# Patient Record
Sex: MALE | Race: WHITE | NOT HISPANIC OR LATINO | URBAN - METROPOLITAN AREA
[De-identification: names, ages, dates, MRNs, and addresses within clinical notes are randomized per-mention and may not be internally consistent; named-entity substitution may affect disease eponyms.]

---

## 2024-02-14 ENCOUNTER — LAB REQUISITION (OUTPATIENT)
Dept: LAB | Facility: LAB | Age: 34
End: 2024-02-14

## 2024-02-14 DIAGNOSIS — Z00.00 ENCOUNTER FOR GENERAL ADULT MEDICAL EXAMINATION WITHOUT ABNORMAL FINDINGS: ICD-10-CM

## 2024-02-14 LAB — TESTOST SERPL-MCNC: 445 NG/DL (ref 240–1000)

## 2024-02-14 PROCEDURE — 84403 ASSAY OF TOTAL TESTOSTERONE: CPT

## 2024-11-12 ENCOUNTER — LAB (OUTPATIENT)
Dept: LAB | Facility: LAB | Age: 34
End: 2024-11-12
Payer: COMMERCIAL

## 2024-11-12 ENCOUNTER — APPOINTMENT (OUTPATIENT)
Dept: PRIMARY CARE | Facility: CLINIC | Age: 34
End: 2024-11-12
Payer: COMMERCIAL

## 2024-11-12 VITALS
BODY MASS INDEX: 26.88 KG/M2 | SYSTOLIC BLOOD PRESSURE: 132 MMHG | HEIGHT: 71 IN | WEIGHT: 192 LBS | DIASTOLIC BLOOD PRESSURE: 81 MMHG | HEART RATE: 69 BPM

## 2024-11-12 DIAGNOSIS — Z13.29 SCREENING FOR THYROID DISORDER: ICD-10-CM

## 2024-11-12 DIAGNOSIS — E55.9 VITAMIN D DEFICIENCY: ICD-10-CM

## 2024-11-12 DIAGNOSIS — R21 RASH: ICD-10-CM

## 2024-11-12 DIAGNOSIS — E78.2 MIXED HYPERLIPIDEMIA: ICD-10-CM

## 2024-11-12 DIAGNOSIS — E78.2 MIXED HYPERLIPIDEMIA: Primary | ICD-10-CM

## 2024-11-12 LAB
ALBUMIN SERPL BCP-MCNC: 5.2 G/DL (ref 3.4–5)
ALP SERPL-CCNC: 54 U/L (ref 33–120)
ALT SERPL W P-5'-P-CCNC: 15 U/L (ref 10–52)
ANION GAP SERPL CALC-SCNC: 12 MMOL/L (ref 10–20)
AST SERPL W P-5'-P-CCNC: 18 U/L (ref 9–39)
BASOPHILS # BLD AUTO: 0.06 X10*3/UL (ref 0–0.1)
BASOPHILS NFR BLD AUTO: 0.5 %
BILIRUB SERPL-MCNC: 1.1 MG/DL (ref 0–1.2)
BUN SERPL-MCNC: 24 MG/DL (ref 6–23)
CALCIUM SERPL-MCNC: 10.1 MG/DL (ref 8.6–10.3)
CHLORIDE SERPL-SCNC: 100 MMOL/L (ref 98–107)
CO2 SERPL-SCNC: 30 MMOL/L (ref 21–32)
CREAT SERPL-MCNC: 0.94 MG/DL (ref 0.5–1.3)
EGFRCR SERPLBLD CKD-EPI 2021: >90 ML/MIN/1.73M*2
EOSINOPHIL # BLD AUTO: 0.21 X10*3/UL (ref 0–0.7)
EOSINOPHIL NFR BLD AUTO: 1.9 %
ERYTHROCYTE [DISTWIDTH] IN BLOOD BY AUTOMATED COUNT: 12.5 % (ref 11.5–14.5)
GLUCOSE SERPL-MCNC: 87 MG/DL (ref 74–99)
HCT VFR BLD AUTO: 45.5 % (ref 41–52)
HGB BLD-MCNC: 15.8 G/DL (ref 13.5–17.5)
IMM GRANULOCYTES # BLD AUTO: 0.06 X10*3/UL (ref 0–0.7)
IMM GRANULOCYTES NFR BLD AUTO: 0.5 % (ref 0–0.9)
LYMPHOCYTES # BLD AUTO: 3.34 X10*3/UL (ref 1.2–4.8)
LYMPHOCYTES NFR BLD AUTO: 29.4 %
MCH RBC QN AUTO: 30.7 PG (ref 26–34)
MCHC RBC AUTO-ENTMCNC: 34.7 G/DL (ref 32–36)
MCV RBC AUTO: 89 FL (ref 80–100)
MONOCYTES # BLD AUTO: 0.78 X10*3/UL (ref 0.1–1)
MONOCYTES NFR BLD AUTO: 6.9 %
NEUTROPHILS # BLD AUTO: 6.9 X10*3/UL (ref 1.2–7.7)
NEUTROPHILS NFR BLD AUTO: 60.8 %
NRBC BLD-RTO: 0 /100 WBCS (ref 0–0)
PLATELET # BLD AUTO: 250 X10*3/UL (ref 150–450)
POTASSIUM SERPL-SCNC: 3.8 MMOL/L (ref 3.5–5.3)
PROT SERPL-MCNC: 7.9 G/DL (ref 6.4–8.2)
RBC # BLD AUTO: 5.14 X10*6/UL (ref 4.5–5.9)
SODIUM SERPL-SCNC: 138 MMOL/L (ref 136–145)
TSH SERPL-ACNC: 2.65 MIU/L (ref 0.44–3.98)
WBC # BLD AUTO: 11.4 X10*3/UL (ref 4.4–11.3)

## 2024-11-12 PROCEDURE — 85025 COMPLETE CBC W/AUTO DIFF WBC: CPT

## 2024-11-12 PROCEDURE — 3008F BODY MASS INDEX DOCD: CPT | Performed by: INTERNAL MEDICINE

## 2024-11-12 PROCEDURE — 84443 ASSAY THYROID STIM HORMONE: CPT

## 2024-11-12 PROCEDURE — 80053 COMPREHEN METABOLIC PANEL: CPT

## 2024-11-12 PROCEDURE — 82306 VITAMIN D 25 HYDROXY: CPT

## 2024-11-12 PROCEDURE — 1036F TOBACCO NON-USER: CPT | Performed by: INTERNAL MEDICINE

## 2024-11-12 PROCEDURE — 36415 COLL VENOUS BLD VENIPUNCTURE: CPT

## 2024-11-12 PROCEDURE — 99214 OFFICE O/P EST MOD 30 MIN: CPT | Performed by: INTERNAL MEDICINE

## 2024-11-12 ASSESSMENT — LIFESTYLE VARIABLES
HAVE YOU OR SOMEONE ELSE BEEN INJURED AS A RESULT OF YOUR DRINKING: NO
SKIP TO QUESTIONS 9-10: 1
HOW OFTEN DO YOU HAVE SIX OR MORE DRINKS ON ONE OCCASION: NEVER
HAS A RELATIVE, FRIEND, DOCTOR, OR ANOTHER HEALTH PROFESSIONAL EXPRESSED CONCERN ABOUT YOUR DRINKING OR SUGGESTED YOU CUT DOWN: NO
AUDIT TOTAL SCORE: 0
HOW MANY STANDARD DRINKS CONTAINING ALCOHOL DO YOU HAVE ON A TYPICAL DAY: PATIENT DOES NOT DRINK
AUDIT-C TOTAL SCORE: 0
HOW OFTEN DO YOU HAVE A DRINK CONTAINING ALCOHOL: NEVER

## 2024-11-12 ASSESSMENT — PATIENT HEALTH QUESTIONNAIRE - PHQ9
SUM OF ALL RESPONSES TO PHQ9 QUESTIONS 1 AND 2: 0
1. LITTLE INTEREST OR PLEASURE IN DOING THINGS: NOT AT ALL
2. FEELING DOWN, DEPRESSED OR HOPELESS: NOT AT ALL

## 2024-11-12 NOTE — PROGRESS NOTES
Assessment/Plan   34 years old male with nonspecific symptoms of rash occurs when he takes the over-the-counter supplement to build his muscle and also now happens after he drinks alcohol.  On my exam there is no evidence of any liver damage but we will do the blood test to make sure there is no liver abnormalities.  Will also do vitamin D levels.  His rest of the blood counts and electrolyte panels also will be checked.    Healthy lifestyle was discussed and I advised him to stop all the supplements including wheat protein.    If his lab test acceptable except elevated cholesterol he will change his lifestyle and he will have a follow-up in 3 months with his PCP to have a reevaluation.          Problem List Items Addressed This Visit       Mixed hyperlipidemia - Primary    Relevant Orders    CBC and Auto Differential (Completed)    Comprehensive Metabolic Panel (Completed)    TSH with reflex to Free T4 if abnormal (Completed)    Vitamin D 25-Hydroxy,Total (for eval of Vitamin D levels)    Screening for thyroid disorder    Relevant Orders    TSH with reflex to Free T4 if abnormal (Completed)    Vitamin D deficiency    Relevant Orders    Vitamin D 25-Hydroxy,Total (for eval of Vitamin D levels)    Rash    Relevant Orders    CBC and Auto Differential (Completed)    Comprehensive Metabolic Panel (Completed)    TSH with reflex to Free T4 if abnormal (Completed)    Vitamin D 25-Hydroxy,Total (for eval of Vitamin D levels)       Subjective     Patient ID: Gerardo Driver is a 34 y.o. male who presents for Results.    History of present illness  34 years old male who is a  came for a visit because he says he could not get into his primary care physician.    He is health-conscious and also he wants to build more muscles.  He has been taking wheat protein but also he was taking on and off Testolone drops.  He says he did it for about maybe a year also few years ago then he developed side effects including rash and  some nonspecific symptoms.  He also says that his cholesterol increases when he takes it.  He stopped it but then he has been taking it again until about 2 to 3 weeks ago.  Because he got the side effects he stopped it.  But he says that even after he stopped it when he drinks alcohol he gets a rash.  He drinks once a week about 6-10 beer at a time.    He has now had the blood test done through his workplace and it shows elevated cholesterol so he wants to have further blood test to make sure he is liver is okay.  He has been reading in the Internet and he says that he was informed by the Internet that his symptoms could be liver damage and/or other abnormalities-from the medication.    He denies any chest pain no palpitation no urine bowel changes.    He is active and exercising regularly.  He is  and denies any risk of STDs.  He does not use any other drugs.    He does vape.  Patient is a father has history of hyperlipidemia.  His grandfather had hyperlipidemia and had premature coronary artery disease.    Rest of the review of systems no acute complaints.    Family History   Problem Relation Name Age of Onset    Hypertension Father      Aortic aneurysm Father        Social History     Socioeconomic History    Marital status:      Spouse name: Not on file    Number of children: Not on file    Years of education: Not on file    Highest education level: Not on file   Occupational History    Not on file   Tobacco Use    Smoking status: Former     Types: Cigarettes    Smokeless tobacco: Former     Types: Chew   Vaping Use    Vaping status: Some Days    Substances: Nicotine    Devices: Refillable tank   Substance and Sexual Activity    Alcohol use: Not Currently    Drug use: Never    Sexual activity: Not on file   Other Topics Concern    Not on file   Social History Narrative    Not on file     Social Drivers of Health     Financial Resource Strain: Not on file   Food Insecurity: Not on file    Transportation Needs: Not on file   Physical Activity: Not on file   Stress: Not on file   Social Connections: Not on file   Intimate Partner Violence: Not on file   Housing Stability: Not on file      Patient has no known allergies.   No current outpatient medications on file.     No current facility-administered medications for this visit.        Objective     Vitals:    11/12/24 1525   BP: 132/81   Pulse: 69        Physical Exam     Normal-built, well-nourished  with no apparent distress. Alert oriented  Skin:  Normal turgor.  No rash.  Has multiple tattoos  Head:  Normocephalic, atraumatic.  Eyes:  Pupils are equal, round,.  No pallor of conjunctivae.  Mouth has moist oral mucosa.  Neck:  Supple.  No JVD.  No carotid bruit.  No thyromegaly. No cervical lymphadenopathy.  No clubbing  Chest:  Vesicular breathing Bilaterally good air entry and bilaterally clear to auscultation.  No wheezing.  No crackles.  Heart:  Regular rate and rhythm.  S1, S2 positive.  No murmur.  Abdomen:  Soft and nontender.  Bowel sounds are positive.  No organomegaly.  Extremities:  Bilaterally no pedal pitting edema.  Bilaterally 2+ dorsalis pedis pulses.  No calf tenderness. Homans sign is negative.  Neuro Exam: No focal signs. Gait is normal.        Problem List Items Addressed This Visit       Mixed hyperlipidemia - Primary    Relevant Orders    CBC and Auto Differential (Completed)    Comprehensive Metabolic Panel (Completed)    TSH with reflex to Free T4 if abnormal (Completed)    Vitamin D 25-Hydroxy,Total (for eval of Vitamin D levels)    Screening for thyroid disorder    Relevant Orders    TSH with reflex to Free T4 if abnormal (Completed)    Vitamin D deficiency    Relevant Orders    Vitamin D 25-Hydroxy,Total (for eval of Vitamin D levels)    Rash    Relevant Orders    CBC and Auto Differential (Completed)    Comprehensive Metabolic Panel (Completed)    TSH with reflex to Free T4 if abnormal (Completed)    Vitamin D  25-Hydroxy,Total (for eval of Vitamin D levels)        Orders Placed This Encounter   Procedures    CBC and Auto Differential     Standing Status:   Future     Number of Occurrences:   1     Standing Expiration Date:   5/12/2025     Order Specific Question:   Release result to [facility]     Answer:   Immediate    Comprehensive Metabolic Panel     Standing Status:   Future     Number of Occurrences:   1     Standing Expiration Date:   5/12/2025     Order Specific Question:   Release result to [facility]     Answer:   Immediate    TSH with reflex to Free T4 if abnormal     Standing Status:   Future     Number of Occurrences:   1     Standing Expiration Date:   5/12/2025     Order Specific Question:   Release result to [facility]     Answer:   Immediate    Vitamin D 25-Hydroxy,Total (for eval of Vitamin D levels)     Standing Status:   Future     Number of Occurrences:   1     Standing Expiration Date:   5/12/2025     Order Specific Question:   Release result to [facility]     Answer:   Immediate        Lab Results   Component Value Date    WBC 11.4 (H) 11/12/2024    HGB 15.8 11/12/2024    HCT 45.5 11/12/2024     11/12/2024    CHOL 211 (H) 09/22/2022    TRIG 117 09/22/2022    HDL 63.0 09/22/2022    ALT 15 11/12/2024    AST 18 11/12/2024     11/12/2024    K 3.8 11/12/2024     11/12/2024    CREATININE 0.94 11/12/2024    BUN 24 (H) 11/12/2024    CO2 30 11/12/2024    TSH 2.65 11/12/2024    INR 1.0 02/23/2020    HGBA1C 4.6 09/22/2022     Lab Results   Component Value Date    CHOL 211 (H) 09/22/2022    CHHDL 3.3 09/22/2022     Testolone drops

## 2024-11-13 LAB — 25(OH)D3 SERPL-MCNC: 26 NG/ML (ref 30–100)

## 2024-11-13 NOTE — RESULT ENCOUNTER NOTE
Has low vitamin D and should take OTC vitamin D 2000 units per day. All the other labs acceptable.  Continue same treatment and follow-up as discussed.

## 2024-12-26 ENCOUNTER — APPOINTMENT (OUTPATIENT)
Dept: UROLOGY | Facility: CLINIC | Age: 34
End: 2024-12-26
Payer: COMMERCIAL

## 2024-12-26 VITALS
WEIGHT: 195 LBS | DIASTOLIC BLOOD PRESSURE: 89 MMHG | HEIGHT: 70 IN | SYSTOLIC BLOOD PRESSURE: 138 MMHG | HEART RATE: 77 BPM | BODY MASS INDEX: 27.92 KG/M2

## 2024-12-26 DIAGNOSIS — N50.0 ATROPHIC TESTICLE: Primary | ICD-10-CM

## 2024-12-26 DIAGNOSIS — N50.89 LUMP IN THE TESTICLE: ICD-10-CM

## 2024-12-26 LAB
POC BILIRUBIN, URINE: NEGATIVE
POC BLOOD, URINE: NEGATIVE
POC GLUCOSE, URINE: NEGATIVE MG/DL
POC KETONES, URINE: NEGATIVE MG/DL
POC LEUKOCYTES, URINE: NEGATIVE
POC NITRITE,URINE: NEGATIVE
POC PH, URINE: 7.5 PH
POC PROTEIN, URINE: NEGATIVE MG/DL
POC SPECIFIC GRAVITY, URINE: 1.01
POC UROBILINOGEN, URINE: 0.2 EU/DL

## 2024-12-26 PROCEDURE — 81003 URINALYSIS AUTO W/O SCOPE: CPT | Performed by: UROLOGY

## 2024-12-26 PROCEDURE — 3008F BODY MASS INDEX DOCD: CPT | Performed by: UROLOGY

## 2024-12-26 PROCEDURE — 99204 OFFICE O/P NEW MOD 45 MIN: CPT | Performed by: UROLOGY

## 2024-12-26 NOTE — PROGRESS NOTES
12/26/2024  34-year-old gentleman self exam finding left scrotal lesion.  No pain.  Also complaining constipation.    History of left undescended testicle    Patient has no nausea, no vomiting, no fever.    Exam: Circumcised normal penis, left atrophic testicle, 5 mm nodule at left head of epididymis; right testicle unremarkable    IRMA: Normal prostate    We discussed left atrophic testicles, small lesion at left epididymis  We discussed scrotal ultrasound  We discussed the normal prostate  All the questions were answered, the patient expressed understanding and agreed to the plan.    Impression  Left undescended testicle  Left epididymal lesion  Constipation    Plan  Scrotal ultrasound for left scrotal lesion  Appointment in 3 weeks    Chief Complaint   Patient presents with    lump testicle     Patient is here today for new patient visit at the request of Leland Lozano for a lump left testicle  that has been present for approx. 2 weeks.  He does have a history of undescended testicle when he was a child.        Physical Exam     TODAYS LAB RESULTS:  Glucose, Urine  NEGATIVE mg/dl NEGATIVE   POC Bilirubin, Urine  NEGATIVE NEGATIVE   POC Ketones, Urine  NEGATIVE mg/dl NEGATIVE   POC Specific Gravity, Urine  1.005 - 1.035 1.015   POC Blood, Urine  NEGATIVE NEGATIVE   POC PH, Urine  No Reference Range Established PH 7.5   POC Protein, Urine  NEGATIVE mg/dl NEGATIVE   POC Urobilinogen, Urine  0.2, 1.0 EU/DL 0.2   Poc Nitrite, Urine  NEGATIVE NEGATIVE   POC Leukocytes, Urine  NEGATIVE NEGATIVE       ASSESSMENT&PLAN:      IMPRESSIONS:

## 2025-01-06 ENCOUNTER — HOSPITAL ENCOUNTER (OUTPATIENT)
Dept: RADIOLOGY | Facility: CLINIC | Age: 35
Discharge: HOME | End: 2025-01-06
Payer: COMMERCIAL

## 2025-01-06 DIAGNOSIS — N50.89 LUMP IN THE TESTICLE: ICD-10-CM

## 2025-01-06 DIAGNOSIS — N50.0 ATROPHIC TESTICLE: ICD-10-CM

## 2025-01-06 PROCEDURE — 76870 US EXAM SCROTUM: CPT

## 2025-01-06 PROCEDURE — 76870 US EXAM SCROTUM: CPT | Performed by: RADIOLOGY

## 2025-01-09 ENCOUNTER — TELEPHONE (OUTPATIENT)
Dept: UROLOGY | Facility: CLINIC | Age: 35
End: 2025-01-09
Payer: COMMERCIAL

## 2025-01-09 ENCOUNTER — PATIENT MESSAGE (OUTPATIENT)
Dept: UROLOGY | Facility: CLINIC | Age: 35
End: 2025-01-09
Payer: COMMERCIAL

## 2025-01-09 DIAGNOSIS — N45.3 EPIDIDYMOORCHITIS: ICD-10-CM

## 2025-01-09 DIAGNOSIS — N50.89 LUMP IN THE TESTICLE: Primary | ICD-10-CM

## 2025-01-09 DIAGNOSIS — N50.0 ATROPHIC TESTICLE: ICD-10-CM

## 2025-01-09 RX ORDER — CIPROFLOXACIN 500 MG/1
500 TABLET ORAL 2 TIMES DAILY
Qty: 14 TABLET | Refills: 0 | Status: SHIPPED | OUTPATIENT
Start: 2025-01-09 | End: 2025-01-16

## 2025-01-09 NOTE — TELEPHONE ENCOUNTER
Gerardo Driver  [birth year]   pt has appt on Jan 16th with Dr Howard   but he wants to know if the follow up appt is necessary...... because he lives   1 hr [place] 30 mins away  .

## 2025-01-16 ENCOUNTER — APPOINTMENT (OUTPATIENT)
Dept: UROLOGY | Facility: CLINIC | Age: 35
End: 2025-01-16
Payer: COMMERCIAL

## 2025-02-10 ENCOUNTER — APPOINTMENT (OUTPATIENT)
Dept: PRIMARY CARE | Facility: CLINIC | Age: 35
End: 2025-02-10
Payer: COMMERCIAL

## 2025-04-27 ENCOUNTER — TELEMEDICINE (OUTPATIENT)
Dept: URGENT CARE | Age: 35
End: 2025-04-27
Payer: COMMERCIAL

## 2025-04-27 DIAGNOSIS — J02.9 PHARYNGITIS, UNSPECIFIED ETIOLOGY: Primary | ICD-10-CM

## 2025-04-27 PROCEDURE — 1036F TOBACCO NON-USER: CPT | Performed by: NURSE PRACTITIONER

## 2025-04-27 PROCEDURE — 99213 OFFICE O/P EST LOW 20 MIN: CPT | Performed by: NURSE PRACTITIONER

## 2025-04-27 RX ORDER — ONDANSETRON 4 MG/1
4 TABLET, ORALLY DISINTEGRATING ORAL EVERY 8 HOURS PRN
Qty: 20 TABLET | Refills: 0 | Status: SHIPPED | OUTPATIENT
Start: 2025-04-27

## 2025-04-27 RX ORDER — AMOXICILLIN 500 MG/1
500 CAPSULE ORAL EVERY 12 HOURS SCHEDULED
Qty: 20 CAPSULE | Refills: 0 | Status: SHIPPED | OUTPATIENT
Start: 2025-04-27 | End: 2025-05-07

## 2025-04-27 NOTE — PROGRESS NOTES
Urgent Care Virtual Video Visit    Patient Location: [place], [place]  Provider Location: [facility]    Patient presents to virtual with concern for strep throat exposure. States  his wife tested positive. Endorses sore throat, fever of 101.4, body aches, fatigue, vomiting, headache    He denies diarrhea, sinus congestion, ear pain, difficulty breathing, swelling in the throat.     He's been taking tylenol for symptoms. He is requesting zofran as well for nausea.     I have communicated my name and active licensure. Video visit completed with realtime synchronous video/audio connection. Informed consent was obtained from the patient. Patient was made aware that my evaluation and diagnosis are limited due to the fact that we are not in the same room during the interview and that this is a virtual encounter that took place via videoconferencing. Patient verbalized understanding.     Patient disposition: Home    Electronically signed by ERIN Alba  3:14 PM